# Patient Record
Sex: FEMALE | Race: WHITE | NOT HISPANIC OR LATINO
[De-identification: names, ages, dates, MRNs, and addresses within clinical notes are randomized per-mention and may not be internally consistent; named-entity substitution may affect disease eponyms.]

---

## 2017-05-16 ENCOUNTER — APPOINTMENT (OUTPATIENT)
Dept: PSYCHIATRY | Facility: CLINIC | Age: 27
End: 2017-05-16

## 2017-05-16 PROBLEM — Z00.00 ENCOUNTER FOR PREVENTIVE HEALTH EXAMINATION: Status: ACTIVE | Noted: 2017-05-16

## 2017-08-01 ENCOUNTER — APPOINTMENT (OUTPATIENT)
Dept: PSYCHIATRY | Facility: CLINIC | Age: 27
End: 2017-08-01
Payer: COMMERCIAL

## 2017-08-01 PROCEDURE — 99214 OFFICE O/P EST MOD 30 MIN: CPT

## 2017-08-01 RX ORDER — FLUOXETINE HYDROCHLORIDE 40 MG/1
40 CAPSULE ORAL DAILY
Qty: 60 | Refills: 2 | Status: ACTIVE | COMMUNITY
Start: 1900-01-01 | End: 1900-01-01

## 2017-10-16 LAB
24R-OH-CALCIDIOL SERPL-MCNC: 33 PG/ML
ALBUMIN SERPL ELPH-MCNC: 4.3 G/DL
ALP BLD-CCNC: 69 U/L
ALT SERPL-CCNC: 31 U/L
ANION GAP SERPL CALC-SCNC: 22 MMOL/L
AST SERPL-CCNC: 24 U/L
BASOPHILS # BLD AUTO: 0.04 K/UL
BASOPHILS NFR BLD AUTO: 0.4 %
BILIRUB SERPL-MCNC: 0.3 MG/DL
BUN SERPL-MCNC: 12 MG/DL
CALCIUM SERPL-MCNC: 9.9 MG/DL
CHLORIDE SERPL-SCNC: 100 MMOL/L
CHOLEST SERPL-MCNC: 264 MG/DL
CHOLEST/HDLC SERPL: 8 RATIO
CO2 SERPL-SCNC: 18 MMOL/L
CREAT SERPL-MCNC: 0.71 MG/DL
EOSINOPHIL # BLD AUTO: 0.09 K/UL
EOSINOPHIL NFR BLD AUTO: 0.8 %
ESTIMATED AVERAGE GLUCOSE: 105 MG/DL
HBA1C MFR BLD HPLC: 5.3 %
HCT VFR BLD CALC: 43.3 %
HDLC SERPL-MCNC: 33 MG/DL
HGB BLD-MCNC: 14.4 G/DL
IMM GRANULOCYTES NFR BLD AUTO: 0.4 %
LDLC SERPL CALC-MCNC: NORMAL
LYMPHOCYTES # BLD AUTO: 2.68 K/UL
LYMPHOCYTES NFR BLD AUTO: 25.3 %
MAN DIFF?: NORMAL
MCHC RBC-ENTMCNC: 30.5 PG
MCHC RBC-ENTMCNC: 33.3 GM/DL
MCV RBC AUTO: 91.7 FL
MONOCYTES # BLD AUTO: 0.86 K/UL
MONOCYTES NFR BLD AUTO: 8.1 %
NEUTROPHILS # BLD AUTO: 6.89 K/UL
NEUTROPHILS NFR BLD AUTO: 65 %
PLATELET # BLD AUTO: 315 K/UL
POTASSIUM SERPL-SCNC: 4.5 MMOL/L
PROT SERPL-MCNC: 7.3 G/DL
RBC # BLD: 4.72 M/UL
RBC # FLD: 12.9 %
SODIUM SERPL-SCNC: 140 MMOL/L
TRIGL SERPL-MCNC: 822 MG/DL
TSH SERPL-ACNC: 1.42 UIU/ML
WBC # FLD AUTO: 10.6 K/UL

## 2017-11-01 ENCOUNTER — APPOINTMENT (OUTPATIENT)
Dept: PSYCHIATRY | Facility: CLINIC | Age: 27
End: 2017-11-01
Payer: COMMERCIAL

## 2017-11-01 DIAGNOSIS — F41.9 ANXIETY DISORDER, UNSPECIFIED: ICD-10-CM

## 2017-11-01 DIAGNOSIS — F42.8 OTHER OBSESSIVE COMPULSIVE DISORDER: ICD-10-CM

## 2017-11-01 DIAGNOSIS — F33.9 MAJOR DEPRESSIVE DISORDER, RECURRENT, UNSPECIFIED: ICD-10-CM

## 2017-11-01 DIAGNOSIS — F41.0 PANIC DISORDER [EPISODIC PAROXYSMAL ANXIETY]: ICD-10-CM

## 2017-11-01 PROCEDURE — 99214 OFFICE O/P EST MOD 30 MIN: CPT

## 2017-11-01 RX ORDER — BUPROPION HYDROCHLORIDE 300 MG/1
300 TABLET, EXTENDED RELEASE ORAL
Qty: 30 | Refills: 2 | Status: ACTIVE | COMMUNITY
Start: 1900-01-01 | End: 1900-01-01

## 2017-12-13 ENCOUNTER — APPOINTMENT (OUTPATIENT)
Dept: PSYCHIATRY | Facility: CLINIC | Age: 27
End: 2017-12-13

## 2021-08-30 ENCOUNTER — NON-APPOINTMENT (OUTPATIENT)
Age: 31
End: 2021-08-30

## 2021-08-31 ENCOUNTER — APPOINTMENT (OUTPATIENT)
Dept: PLASTIC SURGERY | Facility: CLINIC | Age: 31
End: 2021-08-31
Payer: COMMERCIAL

## 2021-08-31 VITALS — OXYGEN SATURATION: 100 % | WEIGHT: 185 LBS | BODY MASS INDEX: 29.38 KG/M2 | HEART RATE: 63 BPM | HEIGHT: 66.5 IN

## 2021-08-31 PROCEDURE — 99205 OFFICE O/P NEW HI 60 MIN: CPT

## 2021-09-08 NOTE — ASSESSMENT
[FreeTextEntry1] : I reviewed with Ms. AQUINO  in detail the risks, benefits, and alternatives of both implant based breast reconstruction as well as autologous tissue reconstruction. \par Specifically, I explained to her that an implant reconstruction usually consists of two separate stages with two surgeries spaced about 4 months apart. At the time of the mastectomy, a tissue expander is placed underneath the pectoralis muscle or on top of the pectoralis muscle and is often reinforced with biologic mesh - acellular dermal matrix. We expand the tissue expander secondarily in the office by injecting saline into the implant percutaneously until the desired size breast mound is achieved. At that point a second stage operation is scheduled where we take her back to operating room and remove the tissue expander and place a permanent breast implant.  The permanent prosthesis can be either silicone or saline.  The first stage of the reconstruction is approximately 3 hours for one breast and 4-5 hours for a bilateral procedure, with a 1-2 night hospital stay and a four-week recovery.  The second stage surgery is an outpatient procedure with a two-week recovery. I reviewed with her the risks of implant reconstruction including but not limited to bleeding, scarring, implant infection, implant rupture, capsule contracture, implant malposition, asymmetry, contour abnormality, and need for revision surgeries. I also discussed the FDA recommendations for silicone implant rupture screening with MRI as well as the details of BII - (breast implant illness) and ALCL. \par \par I then reviewed with MONIKA  the details of autologous tissue reconstruction, specifically using a free flap from her lower abdomen.  This operation entails transplanting the skin, the fat, and blood vessels from the lower abdomen up to the chest wall where we reattach the artery and vein to blood vessels on the chest wall behind the rib to re-vascularize the tissue called a ‘flap’ utilizing microsurgical techniques. We attempt to save all of the muscle fibers of the abdominal rectus muscle to minimize the chance of an abdominal wall weakness, bulge or hernia and only transfer the perforating blood vessels.  This is called a CYNTHIA flap.  I explained to her the scar burden associated with this operation including the scars on the breasts and the lower abdomen and around the umbilicus.  I explained to her that there is a risk of free flap loss and vessel thrombosis requiring a return to the operating room for emergent exploration in an attempt to salvage the flap.  If we do lose a flap do to vascular compromise, we would likely place a tissue expander at the time of her returning to the operating room in order to preserve the breast skin envelope and perform a delayed reconstruction.  I reviewed the risks of abdominal wall morbidity including but not limited to abdominal wall weakness, hernia, or bulge. \par \par The CYNTHIA flap is designed to minimize the risk of abdominal wall morbidity as opposed to a TRAM flap that cuts the abdominal wall muscle, but even a CYNTHIA flap has a small chance of abdominal wall bulge, weakness or hernia.  This operation is approximately 6 hours for one side and 9 hours for a bilateral procedure with a three day hospitalization and a six-week recovery period. I also explained that there is a possibility of contour abnormality, asymmetry between the two breasts, and possible need for revision surgery. A surgery on the native contralateral breast to achieve symmetry in the setting of a unilateral mastectomy is usually required and often performed at the time of the implant exchange or nipple reconstruction. The nipple areolar reconstruction is performed at a later date as a separate procedure. We may also perform immediate sensory nerve repair with AxoGen nerve interposition graft to treat postmastectomy hypesthesia and pain syndrome.\par

## 2021-09-08 NOTE — HISTORY OF PRESENT ILLNESS
[FreeTextEntry1] : 30 y.o female present initial consultation for breast reconstruction at the time of prophylactic mastectomy She is BRCA 1+. Strong family history of breast cancer - mother and two maternal aunts. Mother had breast cancer in her 50's and aunts in their 40's and 50's. Her mother is BRCA+. Clover was tested 5 years ago and is BRCA1+. She HAs been undergoing high risk screening at [facility] with MRI and U/S every 6 months. Had U/S guided biopsy on each breast --> benign fibroadenoma. Is being followed by Dr. Moctezuma.

## 2021-09-08 NOTE — PHYSICAL EXAM
[Bra Size: _______] : Bra Size: [unfilled] [de-identified] : no scars, no masses, no nipple inversion, no nipple discharge, SN- N: 29 cm L, 29 cm R, BD: 16.5 cm  L 17 cm R, grade II/III ptosis  [de-identified] : NT, ND, no masses, +adequate tissue for autologous reconstruction, +adiposity, +laxity

## 2021-09-09 ENCOUNTER — APPOINTMENT (OUTPATIENT)
Dept: PLASTIC SURGERY | Facility: CLINIC | Age: 31
End: 2021-09-09
Payer: COMMERCIAL

## 2021-09-09 VITALS — BODY MASS INDEX: 29.73 KG/M2 | HEIGHT: 66 IN | WEIGHT: 185 LBS

## 2021-09-09 DIAGNOSIS — Z87.891 PERSONAL HISTORY OF NICOTINE DEPENDENCE: ICD-10-CM

## 2021-09-09 DIAGNOSIS — Z87.898 PERSONAL HISTORY OF OTHER SPECIFIED CONDITIONS: ICD-10-CM

## 2021-09-09 PROCEDURE — 99215 OFFICE O/P EST HI 40 MIN: CPT

## 2021-09-09 RX ORDER — CLOMIPRAMINE HYDROCHLORIDE 50 MG/1
50 CAPSULE ORAL
Qty: 90 | Refills: 0 | Status: DISCONTINUED | COMMUNITY
Start: 2017-11-01 | End: 2021-09-09

## 2021-09-09 RX ORDER — RISPERIDONE 0.5 MG/1
0.5 TABLET, FILM COATED ORAL
Qty: 30 | Refills: 2 | Status: DISCONTINUED | COMMUNITY
End: 2021-09-09

## 2021-09-09 RX ORDER — LAMOTRIGINE 150 MG/1
TABLET ORAL
Refills: 0 | Status: ACTIVE | COMMUNITY

## 2021-10-05 ENCOUNTER — NON-APPOINTMENT (OUTPATIENT)
Age: 31
End: 2021-10-05

## 2021-10-25 ENCOUNTER — APPOINTMENT (OUTPATIENT)
Dept: PLASTIC SURGERY | Facility: CLINIC | Age: 31
End: 2021-10-25
Payer: COMMERCIAL

## 2021-10-25 PROCEDURE — 99214 OFFICE O/P EST MOD 30 MIN: CPT | Mod: 95

## 2021-10-30 NOTE — ASSESSMENT
[FreeTextEntry1] : I spent 30 minutes reviewing the plan for surgery and answering Clover and her father questions they stated that I had answered all of their questions adequately. She will obtain CTA to evaluate lower abdominal perforators for CYNTHIA flap recon. We will coordinate with Dr. Wood's office.

## 2021-10-30 NOTE — HISTORY OF PRESENT ILLNESS
[Home] : at home, [unfilled] , at the time of the visit. [Medical Office: ([facility])___] : at the medical office located in  [Parents] : parents [Verbal consent obtained from patient] : the patient, [unfilled] [FreeTextEntry1] : \par \par Clover and her father participated in this teleheath appointment to answer questiona and plan for upcoming BL prophylactic mastectomy and CYNTHIA flap. She has consulted with Dr. Wood and has ellected to proceed with surgery, \par \par \par Hx: 30 y.o female present initial consultation for breast reconstruction at the time of prophylactic mastectomy She is BRCA 1+. Strong family history of breast cancer - mother and two maternal aunts. Mother had breast cancer in her 50's and aunts in their 40's and 50's. Her mother is BRCA+. Clover was tested 5 years ago and is BRCA1+. She HAs been undergoing high risk screening at [facility] with MRI and U/S every 6 months.

## 2021-11-05 ENCOUNTER — NON-APPOINTMENT (OUTPATIENT)
Age: 31
End: 2021-11-05

## 2021-11-05 ENCOUNTER — APPOINTMENT (OUTPATIENT)
Dept: BREAST CENTER | Facility: CLINIC | Age: 31
End: 2021-11-05
Payer: COMMERCIAL

## 2021-11-05 VITALS
WEIGHT: 185 LBS | SYSTOLIC BLOOD PRESSURE: 112 MMHG | BODY MASS INDEX: 29.38 KG/M2 | DIASTOLIC BLOOD PRESSURE: 66 MMHG | HEART RATE: 64 BPM | HEIGHT: 66.5 IN | OXYGEN SATURATION: 98 %

## 2021-11-05 DIAGNOSIS — N60.12 DIFFUSE CYSTIC MASTOPATHY OF LEFT BREAST: ICD-10-CM

## 2021-11-05 DIAGNOSIS — Z78.9 OTHER SPECIFIED HEALTH STATUS: ICD-10-CM

## 2021-11-05 DIAGNOSIS — Z80.0 FAMILY HISTORY OF MALIGNANT NEOPLASM OF DIGESTIVE ORGANS: ICD-10-CM

## 2021-11-05 DIAGNOSIS — R92.2 INCONCLUSIVE MAMMOGRAM: ICD-10-CM

## 2021-11-05 DIAGNOSIS — N60.11 DIFFUSE CYSTIC MASTOPATHY OF LEFT BREAST: ICD-10-CM

## 2021-11-05 DIAGNOSIS — Z80.41 FAMILY HISTORY OF MALIGNANT NEOPLASM OF OVARY: ICD-10-CM

## 2021-11-05 DIAGNOSIS — Z86.59 PERSONAL HISTORY OF OTHER MENTAL AND BEHAVIORAL DISORDERS: ICD-10-CM

## 2021-11-05 DIAGNOSIS — Z80.3 FAMILY HISTORY OF MALIGNANT NEOPLASM OF BREAST: ICD-10-CM

## 2021-11-05 PROCEDURE — 99204 OFFICE O/P NEW MOD 45 MIN: CPT

## 2021-11-05 NOTE — REASON FOR VISIT
[Initial Evaluation] : an initial evaluation [FreeTextEntry1] : The patient comes in with a strong family history of breast and ovarian cancer no known BRCA1 mutation for consultation regarding risk reduction prophylactic mastectomy.  Our

## 2021-11-05 NOTE — PHYSICAL EXAM
[Normocephalic] : normocephalic [Atraumatic] : atraumatic [EOMI] : extra ocular movement intact [Supple] : supple [No Supraclavicular Adenopathy] : no supraclavicular adenopathy [No Cervical Adenopathy] : no cervical adenopathy [Examined in the supine and seated position] : examined in the supine and seated position [No dominant masses] : no dominant masses in right breast  [No dominant masses] : no dominant masses left breast [No Nipple Retraction] : no left nipple retraction [No Nipple Discharge] : no left nipple discharge [Breast Mass Right Breast ___cm] : no masses [Breast Mass Left Breast ___cm] : no masses [Breast Nipple Inversion] : nipples not inverted [Breast Nipple Retraction] : nipples not retracted [Breast Nipple Flattening] : nipples not flattened [Breast Nipple Fissures] : nipples not fissured [Breast Abnormal Lactation (Galactorrhea)] : no galactorrhea [Breast Abnormal Secretion Bloody Fluid] : no bloody discharge [Breast Abnormal Secretion Serous Fluid] : no serous discharge [Breast Abnormal Secretion Opalescent Fluid] : no milky discharge [No Axillary Lymphadenopathy] : no left axillary lymphadenopathy [No Edema] : no edema [No Rashes] : no rashes [No Ulceration] : no ulceration [de-identified] : On exam, the patient has moderately ptotic C-cup breasts.  On palpation, I cannot feel any suspicious densities in either breast.  She has no axillary, supraclavicular, or cervical adenopathy.

## 2021-11-05 NOTE — HISTORY OF PRESENT ILLNESS
[FreeTextEntry1] : The patient is a 30-year-old nulliparous premenopausal white female with Ashkenazi Eastern  Episcopalian heritage.  She underwent menarche at age 13.  She has a strong family history with her mother had breast cancer at age 57.  Her maternal aunt had ovarian cancer at age 44.  Another maternal aunt had breast cancer at age 38.  A paternal great grandmother had breast cancer at age 69.  Her maternal great grandfather had gastric cancer at age 61.  Her maternal grandfather had hepatobiliary cancer at age 39.  The patient's mother as well as the patient and her brother all tested BRCA1 positive with a [id number].3598C>T mutation.  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  The patient comes in for discussion regarding risk reduction prophylactic mastectomy.

## 2021-11-05 NOTE — ASSESSMENT
[FreeTextEntry1] : The patient is a 30-year-old nulliparous premenopausal white female with Ashkenazi Eastern  Zoroastrianism heritage.  She underwent menarche at age 13.  She has a strong family history with her mother had breast cancer at age 57.  Her maternal aunt had ovarian cancer at age 44.  Another maternal aunt had breast cancer at age 38.  A paternal great grandmother had breast cancer at age 69.  Her maternal great grandfather had gastric cancer at age 61.  Her maternal grandfather had hepatobiliary cancer at age 39.  The patient's mother as well as the patient and her brother all tested BRCA1 positive with a c.3598C>T mutation.  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  On exam today I cannot feel any suspicious densities in either breast.  The patient has submitted all her prior films from outside which are being downloaded into our system.  The patient did have her last MRI on May 21, 2021 which showed no suspicious findings.  She has only been undergoing ultrasounds and has not had a mammography so would I like her to have a mammography prior to surgery as well.  She is planning to have this performed at [place].  I spoke to the patient at length with her parents on conference call.  The patient and her family are well aware of the risk reduction strategies for breast and ovarian cancer.  She is already seen Dr. Lerman in consultation for reconstructive surgery and has decided to go forward with risk reduction prophylactic mastectomy.  I think she is an excellent candidate for nipple sparing procedure.  I spoke to her about all risk complications of the procedure including risk of skin flap necrosis, nipple loss, hematoma, and infection and she is well aware of the risk.  She has seen Dr. Lerman and is planning on bilateral CYNTHIA flap autologous reconstruction.  She is currently planned for surgery on January 12, 2022 and will be coming up from [place] and getting her preop testing here.  We will follow up on the mammography report prior to surgery and if she has any further questions she can give me a call back.

## 2021-12-22 ENCOUNTER — NON-APPOINTMENT (OUTPATIENT)
Age: 31
End: 2021-12-22

## 2021-12-22 ENCOUNTER — APPOINTMENT (OUTPATIENT)
Dept: INTERNAL MEDICINE | Facility: CLINIC | Age: 31
End: 2021-12-22
Payer: COMMERCIAL

## 2021-12-22 ENCOUNTER — LABORATORY RESULT (OUTPATIENT)
Age: 31
End: 2021-12-22

## 2021-12-22 VITALS
SYSTOLIC BLOOD PRESSURE: 100 MMHG | HEART RATE: 93 BPM | DIASTOLIC BLOOD PRESSURE: 63 MMHG | TEMPERATURE: 96.8 F | OXYGEN SATURATION: 99 % | WEIGHT: 193 LBS | BODY MASS INDEX: 30.65 KG/M2 | HEIGHT: 66.5 IN

## 2021-12-22 DIAGNOSIS — E78.5 HYPERLIPIDEMIA, UNSPECIFIED: ICD-10-CM

## 2021-12-22 DIAGNOSIS — Z01.818 ENCOUNTER FOR OTHER PREPROCEDURAL EXAMINATION: ICD-10-CM

## 2021-12-22 PROCEDURE — 93000 ELECTROCARDIOGRAM COMPLETE: CPT

## 2021-12-22 PROCEDURE — 36415 COLL VENOUS BLD VENIPUNCTURE: CPT

## 2021-12-22 PROCEDURE — 99203 OFFICE O/P NEW LOW 30 MIN: CPT | Mod: 25

## 2021-12-23 ENCOUNTER — NON-APPOINTMENT (OUTPATIENT)
Age: 31
End: 2021-12-23

## 2021-12-23 LAB
ALBUMIN SERPL ELPH-MCNC: 4.5 G/DL
ALP BLD-CCNC: 70 U/L
ALT SERPL-CCNC: 14 U/L
ANION GAP SERPL CALC-SCNC: 13 MMOL/L
APPEARANCE: CLEAR
AST SERPL-CCNC: 16 U/L
BASOPHILS # BLD AUTO: 0.08 K/UL
BASOPHILS NFR BLD AUTO: 0.7 %
BILIRUB SERPL-MCNC: 0.3 MG/DL
BILIRUBIN URINE: NEGATIVE
BLOOD URINE: ABNORMAL
BUN SERPL-MCNC: 10 MG/DL
CALCIUM SERPL-MCNC: 9.5 MG/DL
CHLORIDE SERPL-SCNC: 101 MMOL/L
CHOLEST SERPL-MCNC: 264 MG/DL
CO2 SERPL-SCNC: 25 MMOL/L
COLOR: YELLOW
CREAT SERPL-MCNC: 1.1 MG/DL
EOSINOPHIL # BLD AUTO: 0.11 K/UL
EOSINOPHIL NFR BLD AUTO: 1 %
ESTIMATED AVERAGE GLUCOSE: 97 MG/DL
GLUCOSE QUALITATIVE U: NEGATIVE
GLUCOSE SERPL-MCNC: 72 MG/DL
HBA1C MFR BLD HPLC: 5 %
HCG SERPL-MCNC: <1 MIU/ML
HCT VFR BLD CALC: 45.8 %
HDLC SERPL-MCNC: 52 MG/DL
HGB BLD-MCNC: 14.5 G/DL
IMM GRANULOCYTES NFR BLD AUTO: 0.4 %
KETONES URINE: NEGATIVE
LDLC SERPL CALC-MCNC: NORMAL MG/DL
LEUKOCYTE ESTERASE URINE: NEGATIVE
LYMPHOCYTES # BLD AUTO: 2.54 K/UL
LYMPHOCYTES NFR BLD AUTO: 22.3 %
MAN DIFF?: NORMAL
MCHC RBC-ENTMCNC: 31 PG
MCHC RBC-ENTMCNC: 31.7 GM/DL
MCV RBC AUTO: 98.1 FL
MONOCYTES # BLD AUTO: 0.71 K/UL
MONOCYTES NFR BLD AUTO: 6.2 %
NEUTROPHILS # BLD AUTO: 7.88 K/UL
NEUTROPHILS NFR BLD AUTO: 69.4 %
NITRITE URINE: NEGATIVE
NONHDLC SERPL-MCNC: 212 MG/DL
PH URINE: 6
PLATELET # BLD AUTO: 332 K/UL
POTASSIUM SERPL-SCNC: 4 MMOL/L
PROT SERPL-MCNC: 7.1 G/DL
PROTEIN URINE: NORMAL
RBC # BLD: 4.67 M/UL
RBC # FLD: 13.8 %
SODIUM SERPL-SCNC: 139 MMOL/L
SPECIFIC GRAVITY URINE: 1.02
TRIGL SERPL-MCNC: 444 MG/DL
TSH SERPL-ACNC: 1.21 UIU/ML
UROBILINOGEN URINE: NORMAL
WBC # FLD AUTO: 11.37 K/UL

## 2021-12-28 ENCOUNTER — NON-APPOINTMENT (OUTPATIENT)
Age: 31
End: 2021-12-28

## 2021-12-29 ENCOUNTER — RESULT REVIEW (OUTPATIENT)
Age: 31
End: 2021-12-29

## 2021-12-30 ENCOUNTER — NON-APPOINTMENT (OUTPATIENT)
Age: 31
End: 2021-12-30

## 2021-12-30 DIAGNOSIS — R92.8 OTHER ABNORMAL AND INCONCLUSIVE FINDINGS ON DIAGNOSTIC IMAGING OF BREAST: ICD-10-CM

## 2022-01-05 ENCOUNTER — APPOINTMENT (OUTPATIENT)
Dept: PLASTIC SURGERY | Facility: CLINIC | Age: 32
End: 2022-01-05

## 2022-01-05 ENCOUNTER — NON-APPOINTMENT (OUTPATIENT)
Age: 32
End: 2022-01-05

## 2022-01-05 RX ORDER — OXYCODONE 5 MG/1
5 TABLET ORAL EVERY 6 HOURS
Qty: 12 | Refills: 0 | Status: ACTIVE | COMMUNITY
Start: 2022-01-05 | End: 1900-01-01

## 2022-01-05 RX ORDER — DIAZEPAM 5 MG/1
5 TABLET ORAL EVERY 8 HOURS
Qty: 9 | Refills: 0 | Status: ACTIVE | COMMUNITY
Start: 2022-01-05 | End: 1900-01-01

## 2022-01-06 ENCOUNTER — RESULT REVIEW (OUTPATIENT)
Age: 32
End: 2022-01-06

## 2022-01-06 ENCOUNTER — NON-APPOINTMENT (OUTPATIENT)
Age: 32
End: 2022-01-06

## 2022-01-12 ENCOUNTER — APPOINTMENT (OUTPATIENT)
Dept: BREAST CENTER | Facility: HOSPITAL | Age: 32
End: 2022-01-12

## 2022-01-18 ENCOUNTER — NON-APPOINTMENT (OUTPATIENT)
Age: 32
End: 2022-01-18

## 2022-01-20 ENCOUNTER — APPOINTMENT (OUTPATIENT)
Dept: BREAST CENTER | Facility: CLINIC | Age: 32
End: 2022-01-20
Payer: COMMERCIAL

## 2022-01-20 ENCOUNTER — APPOINTMENT (OUTPATIENT)
Dept: PLASTIC SURGERY | Facility: CLINIC | Age: 32
End: 2022-01-20
Payer: COMMERCIAL

## 2022-01-20 VITALS — SYSTOLIC BLOOD PRESSURE: 109 MMHG | OXYGEN SATURATION: 97 % | DIASTOLIC BLOOD PRESSURE: 56 MMHG | HEART RATE: 73 BPM

## 2022-01-20 PROCEDURE — 99024 POSTOP FOLLOW-UP VISIT: CPT

## 2022-01-20 NOTE — HISTORY OF PRESENT ILLNESS
[FreeTextEntry1] : The patient is a 31-year-old nulliparous premenopausal white female with Ashkenazi Eastern  Zoroastrian heritage.  She underwent menarche at age 13.  She has a strong family history with her mother had breast cancer at age 57.  Her maternal aunt had ovarian cancer at age 44.  Another maternal aunt had breast cancer at age 38.  A paternal great grandmother had breast cancer at age 69.  Her maternal great grandfather had gastric cancer at age 61.  Her maternal grandfather had hepatobiliary cancer at age 39.  The patient's mother as well as the patient and her brother all tested BRCA1 positive with a [id number].3598C>T mutation.  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  The patient had negative preoperative diagnostic studies and underwent bilateral prophylactic nipple sparing mastectomies with bilateral CYNTHIA flap reconstructions by Dr. Lerman on January 12, 2022.  The final pathology was completely benign and she comes in now for postop follow-up.

## 2022-01-20 NOTE — REASON FOR VISIT
[Post Op: _________] : a [unfilled] post op visit [FreeTextEntry1] : The patient comes in with a strong family history of breast and ovarian cancer and a known BRCA1 mutation.  She underwent bilateral prophylactic nipple sparing mastectomies with bilateral CYNTHIA flap reconstructions by Dr. Lerman on January 12, 2022 and the final pathology was benign.  She comes in for postop follow-up.

## 2022-01-20 NOTE — PHYSICAL EXAM
[Normocephalic] : normocephalic [Atraumatic] : atraumatic [EOMI] : extra ocular movement intact [Supple] : supple [No Supraclavicular Adenopathy] : no supraclavicular adenopathy [No Cervical Adenopathy] : no cervical adenopathy [Examined in the supine and seated position] : examined in the supine and seated position [No dominant masses] : no dominant masses in right breast  [No dominant masses] : no dominant masses left breast [No Nipple Retraction] : no left nipple retraction [No Nipple Discharge] : no left nipple discharge [Breast Mass Right Breast ___cm] : no masses [Breast Mass Left Breast ___cm] : no masses [No Axillary Lymphadenopathy] : no left axillary lymphadenopathy [No Edema] : no edema [No Rashes] : no rashes [No Ulceration] : no ulceration [Breast Nipple Inversion] : nipples not inverted [Breast Nipple Retraction] : nipples not retracted [Breast Nipple Flattening] : nipples not flattened [Breast Nipple Fissures] : nipples not fissured [Breast Abnormal Lactation (Galactorrhea)] : no galactorrhea [Breast Abnormal Secretion Bloody Fluid] : no bloody discharge [Breast Abnormal Secretion Serous Fluid] : no serous discharge [Breast Abnormal Secretion Opalescent Fluid] : no milky discharge [de-identified] : On exam, the patient is doing well status post bilateral prophylactic nipple sparing mastectomies with bilateral CYNTHIA flap reconstructions.  Her native skin flaps are warm and viable and the CYNTHIA flap is viable with an excellent cosmetic result.  3 of her drains were removed by plastic surgery in the office today. [de-identified] : Status post prophylactic nipple sparing mastectomy with CYNTHIA flap reconstruction.  Wounds are healing well with no signs of any infection or hematoma and skin flaps and CYNTHIA flaps are viable. [de-identified] : Status post prophylactic nipple sparing mastectomy with CYNTHIA flap reconstruction.  Wounds are healing well with no signs of any infection or hematoma and skin flaps and CYNTHIA flaps are viable. [de-identified] : Wounds healing well with no signs of infection or hematoma.

## 2022-01-20 NOTE — ASSESSMENT
[FreeTextEntry1] : The patient is a 31-year-old nulliparous premenopausal white female with Ashkenazi Eastern  Latter-day heritage.  She underwent menarche at age 13.  She has a strong family history with her mother had breast cancer at age 57.  Her maternal aunt had ovarian cancer at age 44.  Another maternal aunt had breast cancer at age 38.  A paternal great grandmother had breast cancer at age 69.  Her maternal great grandfather had gastric cancer at age 61.  Her maternal grandfather had hepatobiliary cancer at age 39.  The patient's mother as well as the patient and her brother all tested BRCA1 positive with a c.3598C>T mutation.  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  The patient submitted all her prior films from outside and she did have her last MRI on May 21, 2021 which showed no suspicious findings.  She underwent a bilateral mammography at [place] [facility] with a bilateral ultrasound here at [facility] on January 7, 2022 showing no suspicious findings.  She saw Dr. Lerman in consultation for reconstructive surgery and decided to go forward with risk reduction prophylactic mastectomy.  She underwent bilateral prophylactic nipple sparing mastectomies with bilateral CYNTHIA flap reconstructions performed on January 12, 2022.  Final pathology was benign.  She was given a copy of the pathology for her records.  On exam today, her CYNTHIA flaps are warm and viable and her native skin flaps are viable.  She has good pain control and her breast drains were removed by Dr. Lerman in the office today.  The patient was reassured and should follow-up with plastic surgery from a cosmetic standpoint.  I would like to see her again in 6 months and I would like her to get a bilateral breast MRI 1 year postop.  She understands the need for continued 6-month breast exams as well.  She understands the need for close GYN oncology follow-up and the benefits for prophylactic risk reduction bilateral salpingo-oophorectomy before age 40.

## 2022-01-24 NOTE — PHYSICAL EXAM
[de-identified] : BL breast mounds soft, warm, no collections or infection, NACs pink and viable, CYNTHIA flap skin islands pink and viable, normal Cap Refil. ANGELINA drains removed Bilaterally.  [de-identified] : Soft, NT, ND, incisions C/D/I, Umbo viable, Left ANGELINA drain removed. right ANGELINA drain left in place --> serous.

## 2022-01-24 NOTE — HISTORY OF PRESENT ILLNESS
[FreeTextEntry1] : 30 y/o female presents 8 days s/p bilateral breast prophylactic CYNTHIA flap reconstruction joint with Dr. Wood. Doing very well at home, no f/c/n/v/d. Tolerating regular diet. +BM. Using Oxycodone for pain. 4 ANGELINA drains in place.

## 2022-01-24 NOTE — ASSESSMENT
[FreeTextEntry1] : Healing well POD# 8 from BL CYNTHIA flap recon doing very well. She has 1 right abdominal ANGELINA drain remaining - will coordinate removal with us based on output. I reviewed her PO instructions with her and her mother who present throughout the encounter. Follow up with Anastasia adkins for Drain removal and then see me in 3 weeks,

## 2022-01-31 ENCOUNTER — APPOINTMENT (OUTPATIENT)
Dept: PLASTIC SURGERY | Facility: CLINIC | Age: 32
End: 2022-01-31
Payer: COMMERCIAL

## 2022-01-31 PROCEDURE — 99024 POSTOP FOLLOW-UP VISIT: CPT

## 2022-01-31 NOTE — HISTORY OF PRESENT ILLNESS
[FreeTextEntry1] : 30 y/o female presents 19  days s/p bilateral breast prophylactic CYNTHIA flap reconstruction joint with Dr. Wood. Doing very well at home, denies f/c/n/v. Here for drain Right abdominal drain check, outputs in mid 40's. Patient states noticing that ANGELINA bulb keeps loosing suction.

## 2022-01-31 NOTE — SURGICAL HISTORY
[de-identified] : 01/12/22; B/L Prophylactic mastectomy and CYNTHIA Flap Reconstruction mya/ [place]

## 2022-01-31 NOTE — PHYSICAL EXAM
[de-identified] : BL breast mounds soft, warm, no collections or infection, NACs pink and viable, CYNTHIA flap skin islands pink and viable, normal Cap Refill.  [de-identified] : Soft, NT, ND, incisions C/D/I, Umbo viable, Right ANGELINA drain left in place --> serous.

## 2022-01-31 NOTE — ASSESSMENT
[FreeTextEntry1] : - Healing well from BL CYNTHIA flap recon\par - Right abdominal ANGELINA drain remaining - will email Wed for possible drain removal Thursday\par - Changed right ANGELINA bulb, soft tissue was clogging entry point\par - F/U with Dr. Lerman in 2 weeks\par - Scar treatment to Abdomen \par - Reviewed activity and limitations.\par

## 2022-02-07 ENCOUNTER — APPOINTMENT (OUTPATIENT)
Dept: PLASTIC SURGERY | Facility: CLINIC | Age: 32
End: 2022-02-07
Payer: COMMERCIAL

## 2022-02-07 PROCEDURE — 99024 POSTOP FOLLOW-UP VISIT: CPT

## 2022-02-15 ENCOUNTER — APPOINTMENT (OUTPATIENT)
Dept: PLASTIC SURGERY | Facility: CLINIC | Age: 32
End: 2022-02-15

## 2022-02-17 ENCOUNTER — APPOINTMENT (OUTPATIENT)
Dept: PLASTIC SURGERY | Facility: CLINIC | Age: 32
End: 2022-02-17

## 2022-02-22 ENCOUNTER — APPOINTMENT (OUTPATIENT)
Dept: PLASTIC SURGERY | Facility: CLINIC | Age: 32
End: 2022-02-22
Payer: COMMERCIAL

## 2022-02-22 DIAGNOSIS — N64.89 OTHER SPECIFIED DISORDERS OF BREAST: ICD-10-CM

## 2022-02-22 PROCEDURE — 10160 PNXR ASPIR ABSC HMTMA BULLA: CPT | Mod: 78

## 2022-02-22 PROCEDURE — 99024 POSTOP FOLLOW-UP VISIT: CPT

## 2022-02-22 NOTE — SURGICAL HISTORY
[de-identified] : 01/12/22; B/L Prophylactic mastectomy and CYNTHIA Flap Reconstruction may/ [place]

## 2022-02-22 NOTE — ASSESSMENT
[FreeTextEntry1] : - Healing well from BL CYNTHIA flap recon\par - Has abdominal seroma aspirated today - \par - Wear abdominal compression garment\par - F/U in 2 weeks for possible repeat aspiration. \par

## 2022-02-22 NOTE — PROCEDURE
[Nl] : None [FreeTextEntry1] : abdominal donor site seroma  [FreeTextEntry2] : Percutaneous seroma aspiration abdominal donor site [FreeTextEntry6] : After prepping the area with ChloraPrep I used an 18 gauge needle and a 60 cc syringe to percutaneously aspirate the ballotable fluid collection in the abdominal donor site infraumbilical midline. I aspirated a total of 100 cc of serous fluid without complication and then placed a small bandaid of the puncture site.\par

## 2022-02-22 NOTE — PHYSICAL EXAM
[de-identified] : BL breast mounds soft, warm, no collections or infection, NACs pink and viable, CYNTHIA flap skin islands pink and viable, normal Cap Refill.  [de-identified] : + ballotable fluid collection loser abdominal donor site, no erythema or sign of infection consistant with seroma

## 2022-02-27 NOTE — SURGICAL HISTORY
[de-identified] : 01/12/22; B/L Prophylactic mastectomy and CYNTHIA Flap Reconstruction may/ [place]

## 2022-02-27 NOTE — HISTORY OF PRESENT ILLNESS
[FreeTextEntry1] : 32 y/o female presents 1 month s/p bilateral breast prophylactic CYNTHIA flap reconstruction joint with Dr. Wood. Doing very well at home, denies f/c/n/v. Here for drain Right abdominal drain removal - Drain output down.

## 2022-02-27 NOTE — ASSESSMENT
[FreeTextEntry1] : - Healing well from BL CYNTHIA flap recon\par - Right abdominal ANGELINA drain removed\par - F/U with Dr. Lerman in 2 weeks\par - Scar treatment to Abdomen \par - Reviewed activity and limitations.\par

## 2022-02-27 NOTE — PHYSICAL EXAM
[de-identified] : BL breast mounds soft, warm, no collections or infection, NACs pink and viable, CYNTHIA flap skin islands pink and viable, normal Cap Refill.  [de-identified] : Soft, NT, ND, incisions C/D/I, Umbo viable, Right abdominal ANGELINA drain removed.

## 2022-03-07 ENCOUNTER — APPOINTMENT (OUTPATIENT)
Dept: PLASTIC SURGERY | Facility: CLINIC | Age: 32
End: 2022-03-07
Payer: COMMERCIAL

## 2022-03-07 PROCEDURE — 99024 POSTOP FOLLOW-UP VISIT: CPT

## 2022-03-07 NOTE — PHYSICAL EXAM
[de-identified] : BL breast mounds soft, warm, no collections or infection, healing well, NACs pink and viable, CYNTHIA flap skin islands pink and viable, normal Cap Refill.  [de-identified] : No obvious recurrent seroma - no ballotable fluid shift, aspirated 5cc percutaneously today to ensure no collection that could not be appreciated on exam alone.  no erythema or sign of infection.

## 2022-03-07 NOTE — HISTORY OF PRESENT ILLNESS
[FreeTextEntry1] : 30 y/o female presents 2 months PO s/p bilateral breast prophylactic mastectomy and CYNTHIA flap reconstruction joint with Dr. Wood. Doing well at home, denies f/c/n/v. Has had donor site seroma drained x 2 and presents for follow up. feeling better does not think it has recurred.

## 2022-03-07 NOTE — SURGICAL HISTORY
[de-identified] : 01/12/22; B/L Prophylactic mastectomy and CYNTHIA Flap Reconstruction may/ [place]

## 2022-03-07 NOTE — ASSESSMENT
[FreeTextEntry1] : - Healing well from BL CYNTHIA flap recon. Abdominal seroma resolving - only 5cc today, hopefully will not recur, now only trace fluid - likely will not need any repeat aspiration. \par - Encouraged breast massage to improve edema control and soften up tissue in anticipation of revision surgery \par - F/U in 2 months to plan for 2nd stage recon. \par

## 2022-05-09 ENCOUNTER — APPOINTMENT (OUTPATIENT)
Dept: PLASTIC SURGERY | Facility: CLINIC | Age: 32
End: 2022-05-09
Payer: COMMERCIAL

## 2022-05-09 PROCEDURE — 99213 OFFICE O/P EST LOW 20 MIN: CPT

## 2022-05-09 NOTE — SURGICAL HISTORY
[de-identified] : 01/12/22; B/L Prophylactic mastectomy and CYNTHIA Flap Reconstruction may/ [place]

## 2022-05-09 NOTE — ASSESSMENT
[FreeTextEntry1] : Healing well s/p B/L CYNTHIA flap reconstruction ready for 2nd stage surgery. \par \par will need:\par 1) Excision of fat necrosis right breast lower inner quadrant\par 2) revision BL breast recon with Vertical style mastopexy skin reduction to reposition the footprint of the breast mound and correct nipple malposition and asymmetry\par 3) excision of abdominal dog ears and correction of abdominal donor site with suction lipectomy \par \par - Advised weight loss to achieve the thin contoured waistline patient is striving for. Explained in depth this second procedure.\par \par

## 2022-05-09 NOTE — HISTORY OF PRESENT ILLNESS
[FreeTextEntry1] : 30 y/o female s/p bilateral breast prophylactic mastectomy and CYNTHIA flap reconstruction joint with Dr. Wood on 01/12/22. Doing well at home in [place], denies f/c/n/v. Has had donor site seroma drained x 2 and reports doing well no more collections. Reports having mild firmness right breast and has been massaging daily with relief. Has not done PT. Presents for follow up to discuss 2nd stage surgery.For second stage would like to have liposuction from flanks as well as breast lift. Plans on going to [place] in last week of June and wedding in Aug and Sept.

## 2022-05-09 NOTE — PHYSICAL EXAM
[de-identified] : BL breast mounds soft and well healed. has assymetry of the breast mounds with malposition of the NACs and footprint of the breast with inballance between the skin envelope and the underlying CYNTHIA flap reconstructed breast parenchyma, discreet 2cm x 4cm area of fat necrosis right breast lower inner quadrant, no collections or infection,  [de-identified] : Well healed, no erythema, collection, or signs of infection, soft, NT, ND. + bl abdominal dog ears, step off deformity and lipodystrophy

## 2022-06-26 ENCOUNTER — TRANSCRIPTION ENCOUNTER (OUTPATIENT)
Age: 32
End: 2022-06-26

## 2022-09-07 ENCOUNTER — APPOINTMENT (OUTPATIENT)
Dept: PLASTIC SURGERY | Facility: CLINIC | Age: 32
End: 2022-09-07

## 2022-09-07 DIAGNOSIS — N64.1 FAT NECROSIS OF BREAST: ICD-10-CM

## 2022-09-07 PROCEDURE — 99213 OFFICE O/P EST LOW 20 MIN: CPT | Mod: 95

## 2022-09-07 RX ORDER — OXYCODONE 5 MG/1
5 TABLET ORAL
Qty: 12 | Refills: 0 | Status: ACTIVE | COMMUNITY
Start: 2022-09-07 | End: 1900-01-01

## 2022-09-07 RX ORDER — IBUPROFEN 600 MG/1
600 TABLET, FILM COATED ORAL EVERY 6 HOURS
Qty: 24 | Refills: 0 | Status: ACTIVE | COMMUNITY
Start: 2022-09-07 | End: 1900-01-01

## 2022-09-07 NOTE — HISTORY OF PRESENT ILLNESS
[Home] : at home, [unfilled] , at the time of the visit. [Other Location: e.g. Home (Enter Location, City,State)___] : at [unfilled] [Mother] : mother [Verbal consent obtained from patient] : the patient, [unfilled] [FreeTextEntry1] : Patient Name: MONIKA AQUINO \par : 1990 \par Date: 2022 \par Attending: Dr. Oren Lerman\par \par HPI: preop consultation for bilateral breast reconstruction revision (vertical mastopexy, excision of fat necrosis, abdominal donor site revision, liposuction) on 22. \par \par Allergies: penicillin\par Medication prescribed: oxycodone 5 mg, ibuprofen 600 mg\par \par ROS: complete 14 point review of systems negative except pertinent items reviewed in the HPI. Other non-contributory items reviewed in our new patient questionnaire and we have submitted it to be scanned into the medical record. \par \par Physical Exam: \par completed at time of in office evaluation \par \par Assessment/Plan:\par We have discussed pre and postop instructions, recovery limitations, restrictions and expectations, ERAS protocol, NPO status, transportation home, postop medications, COVID-19 policies, benefits and risks of the procedure. Pre-op labs reviewed. The patient would like to proceed with surgery as scheduled.\par \par LISA CARRANZA NP\par \par

## 2022-09-13 ENCOUNTER — LABORATORY RESULT (OUTPATIENT)
Age: 32
End: 2022-09-13

## 2022-09-14 ENCOUNTER — APPOINTMENT (OUTPATIENT)
Dept: PLASTIC SURGERY | Facility: CLINIC | Age: 32
End: 2022-09-14

## 2022-09-14 ENCOUNTER — APPOINTMENT (OUTPATIENT)
Dept: BREAST CENTER | Facility: CLINIC | Age: 32
End: 2022-09-14

## 2022-09-14 VITALS
BODY MASS INDEX: 29.41 KG/M2 | OXYGEN SATURATION: 99 % | SYSTOLIC BLOOD PRESSURE: 117 MMHG | WEIGHT: 185 LBS | DIASTOLIC BLOOD PRESSURE: 76 MMHG | HEART RATE: 60 BPM

## 2022-09-14 PROCEDURE — 99213 OFFICE O/P EST LOW 20 MIN: CPT

## 2022-09-14 NOTE — REASON FOR VISIT
[Follow-Up: _____] : a [unfilled] follow-up visit [FreeTextEntry1] : The patient comes in with a strong family history of breast and ovarian cancer and a known BRCA1 mutation.  She underwent bilateral prophylactic nipple sparing mastectomies with bilateral CYNTHIA flap reconstructions by Dr. Lerman on January 12, 2022 and the final pathology was benign.  She comes in for routine follow-up.

## 2022-09-14 NOTE — ASSESSMENT
[FreeTextEntry1] : The patient is a 31-year-old nulliparous premenopausal white female with Ashkenazi Eastern  Congregation heritage.  She underwent menarche at age 13.  She has a strong family history with her mother had breast cancer at age 57.  Her maternal aunt had ovarian cancer at age 44.  Another maternal aunt had breast cancer at age 38.  A paternal great grandmother had breast cancer at age 69.  Her maternal great grandfather had gastric cancer at age 61.  Her maternal grandfather had hepatobiliary cancer at age 39.  The patient's mother as well as the patient and her brother all tested BRCA1 positive with a c.3598C>T mutation.  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  The patient submitted all her prior films from outside and she did have her last MRI on May 21, 2021 which showed no suspicious findings.  She underwent a bilateral mammography at [place] with a bilateral ultrasound here at [place] on January 7, 2022 showing no suspicious findings.  She saw Dr. Lerman in consultation for reconstructive surgery and decided to go forward with risk reduction prophylactic mastectomy.  She underwent bilateral prophylactic nipple sparing mastectomies with bilateral CYNTHIA flap reconstructions performed on January 12, 2022.  Final pathology was benign.  On exam today, her CYNTHIA flaps are well-healed and she has a good cosmetic result with no suspicious findings.  She is planning on her CYNTHIA flap revision surgery to be performed by Dr. Lerman on September 16, 2022.  I would like to see her again in 6 months and I would like her to get a bilateral breast MRI 1 year postop around March 2023 and she was given a prescription.  She understands the need for continued 6-month breast exams as well.  She understands the need for close GYN oncology follow-up and the benefits for prophylactic risk reduction bilateral salpingo-oophorectomy before age 40.

## 2022-09-14 NOTE — PHYSICAL EXAM
[Normocephalic] : normocephalic [Atraumatic] : atraumatic [EOMI] : extra ocular movement intact [Supple] : supple [No Supraclavicular Adenopathy] : no supraclavicular adenopathy [No Cervical Adenopathy] : no cervical adenopathy [Examined in the supine and seated position] : examined in the supine and seated position [No dominant masses] : no dominant masses in right breast  [No dominant masses] : no dominant masses left breast [No Nipple Retraction] : no left nipple retraction [No Nipple Discharge] : no left nipple discharge [Breast Mass Right Breast ___cm] : no masses [Breast Mass Left Breast ___cm] : no masses [No Axillary Lymphadenopathy] : no left axillary lymphadenopathy [No Edema] : no edema [No Rashes] : no rashes [No Ulceration] : no ulceration [Breast Nipple Inversion] : nipples not inverted [Breast Nipple Retraction] : nipples not retracted [Breast Nipple Flattening] : nipples not flattened [Breast Nipple Fissures] : nipples not fissured [Breast Abnormal Lactation (Galactorrhea)] : no galactorrhea [Breast Abnormal Secretion Bloody Fluid] : no bloody discharge [Breast Abnormal Secretion Serous Fluid] : no serous discharge [Breast Abnormal Secretion Opalescent Fluid] : no milky discharge [de-identified] : On exam, the patient has done well status post bilateral prophylactic nipple sparing mastectomies with bilateral CYNTHIA flap reconstructions.  She has a good result with no suspicious findings in either CYNTHIA flap.  She has no axillary, supraclavicular, or cervical adenopathy.  She has no sensation in either nipple areolar complex or inferior pole.  She has some moderate sensation on the superior and medial poles but no sensation on either lateral pole [de-identified] : Status post prophylactic nipple sparing mastectomy with CYNTHIA flap reconstruction.  Wounds have healed well and she has no suspicious findings. [de-identified] : Status post prophylactic nipple sparing mastectomy with CYNTHIA flap reconstruction.  Wounds have healed well and she has no suspicious findings.

## 2022-09-14 NOTE — HISTORY OF PRESENT ILLNESS
[FreeTextEntry1] : The patient is a 31-year-old nulliparous premenopausal white female with Ashkenazi Eastern  [employer]ist heritage.  She underwent menarche at age 13.  She has a strong family history with her mother had breast cancer at age 57.  Her maternal aunt had ovarian cancer at age 44.  Another maternal aunt had breast cancer at age 38.  A paternal great grandmother had breast cancer at age 69.  Her maternal great grandfather had gastric cancer at age 61.  Her maternal grandfather had hepatobiliary cancer at age 39.  The patient's mother as well as the patient and her brother all tested BRCA1 positive with a c.3598C>T mutation.  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  The patient had negative preoperative diagnostic studies and underwent bilateral prophylactic nipple sparing mastectomies with bilateral CYNTHIA flap reconstructions by Dr. Lerman on January 12, 2022.  The final pathology was completely benign and she comes in now for routine follow-up.

## 2022-09-15 ENCOUNTER — TRANSCRIPTION ENCOUNTER (OUTPATIENT)
Age: 32
End: 2022-09-15

## 2022-09-15 NOTE — ASU PATIENT PROFILE, ADULT - PRO MENTAL HEALTH SX RECENT
Problem: Patient Care Overview  Goal: Plan of Care Review  Outcome: Ongoing (interventions implemented as appropriate)  Plan of care reviewed with patient, daughter and care giver. Pt transferred from ICU this evening. Pt in no distress , no s/s of pain noted. Sinus jose on telemetry. Denies any chest pain.Pt on honey thickened liquid and aspiration precaution. Safety maintained .        sad

## 2022-09-16 ENCOUNTER — OUTPATIENT (OUTPATIENT)
Dept: OUTPATIENT SERVICES | Facility: HOSPITAL | Age: 32
LOS: 1 days | Discharge: HOME | End: 2022-09-16

## 2022-09-16 ENCOUNTER — RESULT REVIEW (OUTPATIENT)
Age: 32
End: 2022-09-16

## 2022-09-16 ENCOUNTER — TRANSCRIPTION ENCOUNTER (OUTPATIENT)
Age: 32
End: 2022-09-16

## 2022-09-16 VITALS
SYSTOLIC BLOOD PRESSURE: 126 MMHG | DIASTOLIC BLOOD PRESSURE: 68 MMHG | RESPIRATION RATE: 16 BRPM | TEMPERATURE: 98 F | HEART RATE: 88 BPM | OXYGEN SATURATION: 99 %

## 2022-09-16 VITALS
TEMPERATURE: 98 F | SYSTOLIC BLOOD PRESSURE: 120 MMHG | DIASTOLIC BLOOD PRESSURE: 70 MMHG | HEIGHT: 66 IN | WEIGHT: 181.88 LBS | HEART RATE: 63 BPM | RESPIRATION RATE: 16 BRPM | OXYGEN SATURATION: 100 %

## 2022-09-16 DIAGNOSIS — Z98.890 OTHER SPECIFIED POSTPROCEDURAL STATES: Chronic | ICD-10-CM

## 2022-09-16 PROCEDURE — 19380 REVJ RECONSTRUCTED BREAST: CPT | Mod: 50

## 2022-09-16 PROCEDURE — 14001 TIS TRNFR TRUNK 10.1-30SQCM: CPT | Mod: 59

## 2022-09-16 PROCEDURE — 88305 TISSUE EXAM BY PATHOLOGIST: CPT | Mod: 26

## 2022-09-16 PROCEDURE — 15771 GRFG AUTOL FAT LIPO 50 CC/<: CPT | Mod: 59

## 2022-09-16 PROCEDURE — 15772 GRFG AUTOL FAT LIPO EA ADDL: CPT

## 2022-09-16 PROCEDURE — 19120 REMOVAL OF BREAST LESION: CPT | Mod: RT,59

## 2022-09-16 RX ORDER — FLUOXETINE HCL 10 MG
80 CAPSULE ORAL
Qty: 0 | Refills: 0 | DISCHARGE

## 2022-09-16 RX ORDER — FENTANYL CITRATE 50 UG/ML
50 INJECTION INTRAVENOUS
Refills: 0 | Status: DISCONTINUED | OUTPATIENT
Start: 2022-09-16 | End: 2022-09-16

## 2022-09-16 RX ORDER — ONDANSETRON 8 MG/1
4 TABLET, FILM COATED ORAL ONCE
Refills: 0 | Status: DISCONTINUED | OUTPATIENT
Start: 2022-09-16 | End: 2022-09-16

## 2022-09-16 RX ORDER — LAMOTRIGINE 25 MG/1
1 TABLET, ORALLY DISINTEGRATING ORAL
Qty: 0 | Refills: 0 | DISCHARGE

## 2022-09-16 RX ORDER — DIAZEPAM 5 MG
2.5 TABLET ORAL ONCE
Refills: 0 | Status: DISCONTINUED | OUTPATIENT
Start: 2022-09-16 | End: 2022-09-16

## 2022-09-16 RX ORDER — OXYCODONE HYDROCHLORIDE 5 MG/1
5 TABLET ORAL ONCE
Refills: 0 | Status: DISCONTINUED | OUTPATIENT
Start: 2022-09-16 | End: 2022-09-16

## 2022-09-16 RX ORDER — HYDROMORPHONE HYDROCHLORIDE 2 MG/ML
0.5 INJECTION INTRAMUSCULAR; INTRAVENOUS; SUBCUTANEOUS
Refills: 0 | Status: DISCONTINUED | OUTPATIENT
Start: 2022-09-16 | End: 2022-09-16

## 2022-09-16 RX ORDER — APREPITANT 80 MG/1
40 CAPSULE ORAL ONCE
Refills: 0 | Status: COMPLETED | OUTPATIENT
Start: 2022-09-16 | End: 2022-09-16

## 2022-09-16 RX ORDER — SODIUM CHLORIDE 9 MG/ML
1000 INJECTION, SOLUTION INTRAVENOUS
Refills: 0 | Status: DISCONTINUED | OUTPATIENT
Start: 2022-09-16 | End: 2022-09-16

## 2022-09-16 RX ORDER — BUPROPION HYDROCHLORIDE 150 MG/1
1 TABLET, EXTENDED RELEASE ORAL
Qty: 0 | Refills: 0 | DISCHARGE

## 2022-09-16 RX ORDER — ACETAMINOPHEN 500 MG
1000 TABLET ORAL ONCE
Refills: 0 | Status: COMPLETED | OUTPATIENT
Start: 2022-09-16 | End: 2022-09-16

## 2022-09-16 RX ADMIN — APREPITANT 40 MILLIGRAM(S): 80 CAPSULE ORAL at 07:05

## 2022-09-16 RX ADMIN — Medication 1000 MILLIGRAM(S): at 07:05

## 2022-09-16 RX ADMIN — SODIUM CHLORIDE 100 MILLILITER(S): 9 INJECTION, SOLUTION INTRAVENOUS at 11:39

## 2022-09-16 RX ADMIN — FENTANYL CITRATE 50 MICROGRAM(S): 50 INJECTION INTRAVENOUS at 12:25

## 2022-09-16 RX ADMIN — Medication 2.5 MILLIGRAM(S): at 12:45

## 2022-09-16 RX ADMIN — HYDROMORPHONE HYDROCHLORIDE 0.5 MILLIGRAM(S): 2 INJECTION INTRAMUSCULAR; INTRAVENOUS; SUBCUTANEOUS at 12:20

## 2022-09-16 RX ADMIN — HYDROMORPHONE HYDROCHLORIDE 0.5 MILLIGRAM(S): 2 INJECTION INTRAMUSCULAR; INTRAVENOUS; SUBCUTANEOUS at 12:05

## 2022-09-16 RX ADMIN — OXYCODONE HYDROCHLORIDE 5 MILLIGRAM(S): 5 TABLET ORAL at 13:45

## 2022-09-16 RX ADMIN — OXYCODONE HYDROCHLORIDE 5 MILLIGRAM(S): 5 TABLET ORAL at 14:04

## 2022-09-16 RX ADMIN — SODIUM CHLORIDE 100 MILLILITER(S): 9 INJECTION, SOLUTION INTRAVENOUS at 14:15

## 2022-09-16 RX ADMIN — FENTANYL CITRATE 50 MICROGRAM(S): 50 INJECTION INTRAVENOUS at 12:40

## 2022-09-16 NOTE — ASU DISCHARGE PLAN (ADULT/PEDIATRIC) - CARE PROVIDER_API CALL
Lerman, Oren Z (MD)  Plastic Surgery  [facility] [address] [place], [place] [address]  Phone: [phone]  Fax: [phone]  Follow Up Time: Routine

## 2022-09-16 NOTE — BRIEF OPERATIVE NOTE - NSICDXBRIEFPROCEDURE_GEN_ALL_CORE_FT
PROCEDURES:  Revision of reconstruction of both breasts using fat grafts 16-Sep-2022 11:49:48  Ashley Chacko  Mastopexy, bilateral 16-Sep-2022 11:50:01  Ashley Chacko

## 2022-09-16 NOTE — PRE-ANESTHESIA EVALUATION ADULT - NSANTHADDINFOFT_GEN_ALL_CORE
Anesthetic plan was thoroughly discussed including the risk, benefit, alternatives and potential complications such as minor injuries to airway/teeth/lips, nerve damage, aspiration, hemodynamic instability and organ dysfunction related to the underlying pathology and the planned procedure. All questions were answered and the patient wants to proceed with the GETA plan. Consent in chart.

## 2022-09-16 NOTE — ASU DISCHARGE PLAN (ADULT/PEDIATRIC) - PROCEDURE
b/l mastopexy with fat grafting and excision of fat necrosis. Revision of abdominal scar and liposuction of abdomen and flanks

## 2022-09-16 NOTE — BRIEF OPERATIVE NOTE - OPERATION/FINDINGS
b/l mastopexy with excision of Right breast fat necrosis.  B/l breast fat grafting to upper pole.  dog ear excision on abdominal scar  liposuction of abdomen and flanks

## 2022-09-16 NOTE — ASU PREOP CHECKLIST - PATIENT'S PERSONAL PROPERTY REMOVED
right earring unable to remove, patient refused to remove it, risks explained pt still refused.  OR RN Tamy informed patient has a right earrings unable to remove, patient refused to remove it, risks explained to patient still refused,  OR MATT Kaur informed.   OR MATT Dove  and Dr Greer aware 2 white earrings removed by patient on right ear/jewelry

## 2022-09-20 ENCOUNTER — APPOINTMENT (OUTPATIENT)
Dept: PLASTIC SURGERY | Facility: CLINIC | Age: 32
End: 2022-09-20

## 2022-09-20 VITALS — HEIGHT: 66.5 IN | BODY MASS INDEX: 29.38 KG/M2 | OXYGEN SATURATION: 98 % | HEART RATE: 72 BPM | WEIGHT: 185 LBS

## 2022-09-20 LAB — SURGICAL PATHOLOGY STUDY: SIGNIFICANT CHANGE UP

## 2022-09-20 PROCEDURE — 99024 POSTOP FOLLOW-UP VISIT: CPT

## 2022-09-20 NOTE — ASSESSMENT
[FreeTextEntry1] : Healing well. PO care instructions reviewed, F/U in 6 weeks.  PAST MEDICAL HISTORY:  Diabetes     EtOH dependence

## 2022-09-20 NOTE — SURGICAL HISTORY
[de-identified] : 01/12/22; B/L Prophylactic mastectomy and CYNTHIA Flap Reconstruction may/ [place]  [de-identified] : 09/16/22; bilateral breast reconstruction revision (vertical mastopexy, excision of fat necrosis, abdominal donor site revision, liposuction, fat grafting)

## 2022-09-20 NOTE — PHYSICAL EXAM
[de-identified] : breast mounds soft, good contouir and symmetry, NACs viable, incisions C/d/I, no infection or collection [de-identified] : soft, no collections, + PO echymosis, good contour, no infection

## 2022-09-20 NOTE — HISTORY OF PRESENT ILLNESS
[FreeTextEntry1] : 30 y/o female presents 4 days s/p bilateral breast reconstruction revision (vertical mastopexy, excision of fat necrosis, abdominal donor site revision, liposuction). Denies any f/c/n/v. Patient is taking oxycodone, Tylenol, and Advil.

## 2022-12-19 ENCOUNTER — APPOINTMENT (OUTPATIENT)
Dept: PLASTIC SURGERY | Facility: CLINIC | Age: 32
End: 2022-12-19

## 2022-12-19 DIAGNOSIS — N65.0 DEFORMITY OF RECONSTRUCTED BREAST: ICD-10-CM

## 2022-12-19 DIAGNOSIS — N65.1 DEFORMITY OF RECONSTRUCTED BREAST: ICD-10-CM

## 2022-12-19 PROCEDURE — 99212 OFFICE O/P EST SF 10 MIN: CPT

## 2022-12-19 NOTE — PHYSICAL EXAM
[de-identified] : breast mounds soft, good contour and symmetry, well healed. [de-identified] : soft, no collections, , good contour, no infection

## 2022-12-19 NOTE — ASSESSMENT
[FreeTextEntry1] : Healing well. PO care instructions reviewed, encouraged use of scar cream, D/C aquaphor, return to all normal activities. F/U in 6 months

## 2022-12-19 NOTE — HISTORY OF PRESENT ILLNESS
[FreeTextEntry1] : 33 y/o female presents 3 months PO s/p bilateral revision breast reconstruction (vertical mastopexy, excision of fat necrosis, abdominal donor site revision, liposuction) on 9/16/22. Denies any f/c/n/v. Happy with the results

## 2022-12-19 NOTE — SURGICAL HISTORY
[de-identified] : 01/12/22; B/L Prophylactic mastectomy and CYNTHIA Flap Reconstruction may/ [place]  [de-identified] : 09/16/22; bilateral breast reconstruction revision (vertical mastopexy, excision of fat necrosis, abdominal donor site revision, liposuction, fat grafting)

## 2023-03-09 ENCOUNTER — OUTPATIENT (OUTPATIENT)
Dept: OUTPATIENT SERVICES | Facility: HOSPITAL | Age: 33
LOS: 1 days | End: 2023-03-09

## 2023-03-09 ENCOUNTER — APPOINTMENT (OUTPATIENT)
Dept: MRI IMAGING | Facility: CLINIC | Age: 33
End: 2023-03-09
Payer: COMMERCIAL

## 2023-03-09 DIAGNOSIS — Z98.890 OTHER SPECIFIED POSTPROCEDURAL STATES: Chronic | ICD-10-CM

## 2023-03-09 PROCEDURE — 77049 MRI BREAST C-+ W/CAD BI: CPT | Mod: 26

## 2023-03-15 DIAGNOSIS — Z15.01 GENETIC SUSCEPTIBILITY TO MALIGNANT NEOPLASM OF BREAST: ICD-10-CM

## 2023-03-15 DIAGNOSIS — Z90.13 ACQUIRED ABSENCE OF BILATERAL BREASTS AND NIPPLES: ICD-10-CM

## 2023-04-04 ENCOUNTER — APPOINTMENT (OUTPATIENT)
Dept: BREAST CENTER | Facility: CLINIC | Age: 33
End: 2023-04-04
Payer: COMMERCIAL

## 2023-04-04 VITALS
HEIGHT: 66 IN | HEART RATE: 65 BPM | BODY MASS INDEX: 29.73 KG/M2 | OXYGEN SATURATION: 98 % | WEIGHT: 185 LBS | SYSTOLIC BLOOD PRESSURE: 131 MMHG | DIASTOLIC BLOOD PRESSURE: 81 MMHG

## 2023-04-04 DIAGNOSIS — Z15.09 GENETIC SUSCEPTIBILITY TO MALIGNANT NEOPLASM OF BREAST: ICD-10-CM

## 2023-04-04 DIAGNOSIS — Z80.3 FAMILY HISTORY OF MALIGNANT NEOPLASM OF BREAST: ICD-10-CM

## 2023-04-04 DIAGNOSIS — Z15.01 GENETIC SUSCEPTIBILITY TO MALIGNANT NEOPLASM OF BREAST: ICD-10-CM

## 2023-04-04 DIAGNOSIS — Z90.13 ACQUIRED ABSENCE OF BILATERAL BREASTS AND NIPPLES: ICD-10-CM

## 2023-04-04 DIAGNOSIS — Z12.39 ENCOUNTER FOR OTHER SCREENING FOR MALIGNANT NEOPLASM OF BREAST: ICD-10-CM

## 2023-04-04 DIAGNOSIS — Z80.41 FAMILY HISTORY OF MALIGNANT NEOPLASM OF OVARY: ICD-10-CM

## 2023-04-04 PROCEDURE — 99213 OFFICE O/P EST LOW 20 MIN: CPT

## 2023-04-04 NOTE — PHYSICAL EXAM
[Normocephalic] : normocephalic [Atraumatic] : atraumatic [EOMI] : extra ocular movement intact [Supple] : supple [No Supraclavicular Adenopathy] : no supraclavicular adenopathy [No Cervical Adenopathy] : no cervical adenopathy [Examined in the supine and seated position] : examined in the supine and seated position [No dominant masses] : no dominant masses in right breast  [No dominant masses] : no dominant masses left breast [No Nipple Retraction] : no left nipple retraction [No Nipple Discharge] : no left nipple discharge [Breast Mass Right Breast ___cm] : no masses [Breast Mass Left Breast ___cm] : no masses [No Axillary Lymphadenopathy] : no left axillary lymphadenopathy [No Edema] : no edema [No Rashes] : no rashes [No Ulceration] : no ulceration [Breast Nipple Inversion] : nipples not inverted [Breast Nipple Retraction] : nipples not retracted [Breast Nipple Flattening] : nipples not flattened [Breast Nipple Fissures] : nipples not fissured [Breast Abnormal Lactation (Galactorrhea)] : no galactorrhea [Breast Abnormal Secretion Bloody Fluid] : no bloody discharge [Breast Abnormal Secretion Serous Fluid] : no serous discharge [Breast Abnormal Secretion Opalescent Fluid] : no milky discharge [de-identified] : On exam, the patient has done well status post bilateral prophylactic nipple sparing mastectomies with bilateral CYNTHIA flap reconstructions.  She has a good result with no suspicious findings in either CYNTHIA flap.  She has no axillary, supraclavicular, or cervical adenopathy.  She has no sensation in either nipple areolar complex or inferior pole.  She has some moderate sensation on the medial poles but no sensation on the superior or lateral poles [de-identified] : Status post prophylactic nipple sparing mastectomy with CYNTHIA flap reconstruction.  Wounds have healed well and she has no suspicious findings. [de-identified] : Status post prophylactic nipple sparing mastectomy with CYNTHIA flap reconstruction.  Wounds have healed well and she has no suspicious findings.

## 2023-04-04 NOTE — HISTORY OF PRESENT ILLNESS
[FreeTextEntry1] : The patient is a 32-year-old nulliparous premenopausal white female with Ashkenazi Eastern  [employer]din heritage.  She underwent menarche at age 13.  She has a strong family history with her mother had breast cancer at age 57.  Her maternal aunt had ovarian cancer at age 44.  Another maternal aunt had breast cancer at age 38.  A paternal great grandmother had breast cancer at age 69.  Her maternal great grandfather had gastric cancer at age 61.  Her maternal grandfather had hepatobiliary cancer at age 39.  The patient's mother as well as the patient and her brother all tested BRCA1 positive with a c.3598C>T mutation.  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  The patient had negative preoperative diagnostic studies and underwent bilateral prophylactic nipple sparing mastectomies with bilateral CYNTHIA flap reconstructions by Dr. Lerman on January 12, 2022.  The final pathology was completely benign and she comes in now for routine follow-up.

## 2023-04-04 NOTE — ASSESSMENT
[FreeTextEntry1] : The patient is a 32-year-old nulliparous premenopausal white female with Ashkenazi Eastern  Zoroastrian heritage.  She underwent menarche at age 13.  She has a strong family history with her mother had breast cancer at age 57.  Her maternal aunt had ovarian cancer at age 44.  Another maternal aunt had breast cancer at age 38.  A paternal great grandmother had breast cancer at age 69.  Her maternal great grandfather had gastric cancer at age 61.  Her maternal grandfather had hepatobiliary cancer at age 39.  The patient's mother as well as the patient and her brother all tested BRCA1 positive with a c.3598C>T mutation.  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  The patient submitted all her prior films from outside and she did have her last MRI on May 21, 2021 which showed no suspicious findings.  She underwent a bilateral mammography at [place] with a bilateral ultrasound here at [place] on January 7, 2022 showing no suspicious findings.  She saw Dr. Lerman in consultation for reconstructive surgery and decided to go forward with risk reduction prophylactic mastectomy.  She underwent bilateral prophylactic nipple sparing mastectomies with bilateral CYNTHIA flap reconstructions performed on January 12, 2022.  Final pathology was benign.  On exam today, her CYNTHIA flaps are well-healed and she has a good cosmetic result with no suspicious findings.  She underwent her CYNTHIA flap revision surgery performed by Dr. Lerman on September 16, 2022.  She underwent a follow-up bilateral breast MRI about 1 year postop on March 9, 2023 which was reviewed and showed no suspicious enhancement or findings.  I would like to see her again now in 6 months in October 2023.  If she is doing well at that time I can consider yearly follow-ups.  She understands the need for close GYN oncology follow-up and the benefits for prophylactic risk reduction bilateral salpingo-oophorectomy before age 40.

## 2023-08-14 ENCOUNTER — APPOINTMENT (OUTPATIENT)
Dept: PLASTIC SURGERY | Facility: CLINIC | Age: 33
End: 2023-08-14
Payer: COMMERCIAL

## 2023-08-14 DIAGNOSIS — Z42.1 ENCOUNTER FOR BREAST RECONSTRUCTION FOLLOWING MASTECTOMY: ICD-10-CM

## 2023-08-14 PROCEDURE — 99213 OFFICE O/P EST LOW 20 MIN: CPT

## 2023-08-14 NOTE — HISTORY OF PRESENT ILLNESS
[FreeTextEntry1] : 33 y/o female presents s/p bilateral revision breast reconstruction (vertical mastopexy, excision of fat necrosis, abdominal donor site revision, liposuction) on 9/16/22. Denies any f/c/n/v. Patient is here for a follow-up visit to reassess healing. No complains offered. She is very pleased with her results.

## 2023-08-14 NOTE — SURGICAL HISTORY
[de-identified] : 01/12/22; B/L Prophylactic mastectomy and CYNTHIA Flap Reconstruction may/ [place]  [de-identified] : 09/16/22; bilateral breast reconstruction revision (vertical mastopexy, excision of fat necrosis, abdominal donor site revision, liposuction, fat grafting)

## 2023-08-14 NOTE — PHYSICAL EXAM
[de-identified] : breast mounds soft, good contour and symmetry, well healed. [de-identified] : soft, no collections, , good contour, no infection, no hernias.

## 2023-08-14 NOTE — ASSESSMENT
[FreeTextEntry1] : Well healed. with excellent result. Reviewed care and course FU with Dr. Wood for annual exam Can F/U with us if anything arises PRN..

## 2024-01-25 NOTE — HISTORY OF PRESENT ILLNESS
[FreeTextEntry1] : 32 y/o female presents 5.5 weeks s/p bilateral breast prophylactic CYNTHIA flap reconstruction joint with Dr. Wood. Doing well at home, denies f/c/n/v. Has abdominal fluid collection.  Quality 130: Documentation Of Current Medications In The Medical Record: Current Medications Documented Quality 226: Preventive Care And Screening: Tobacco Use: Screening And Cessation Intervention: Patient screened for tobacco use and is an ex/non-smoker Detail Level: Detailed Quality 431: Preventive Care And Screening: Unhealthy Alcohol Use - Screening: Patient not identified as an unhealthy alcohol user when screened for unhealthy alcohol use using a systematic screening method

## 2024-05-27 ENCOUNTER — NON-APPOINTMENT (OUTPATIENT)
Age: 34
End: 2024-05-27

## 2024-06-02 ENCOUNTER — NON-APPOINTMENT (OUTPATIENT)
Age: 34
End: 2024-06-02

## 2024-06-02 NOTE — HISTORY OF PRESENT ILLNESS
[FreeTextEntry1] : The patient is a 33-year-old nulliparous premenopausal white female with Ashkenazi Eastern  M[employer]on heritage.  She underwent menarche at age 13.  She has a strong family history with her mother had breast cancer at age 57.  Her maternal aunt had ovarian cancer at age 44.  Another maternal aunt had breast cancer at age 38.  A paternal great grandmother had breast cancer at age 69.  Her maternal great grandfather had gastric cancer at age 61.  Her maternal grandfather had hepatobiliary cancer at age 39.  The patient's mother as well as the patient and her brother all tested BRCA1 positive with a c.3598C>T mutation.  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  The patient had negative preoperative diagnostic studies and underwent bilateral prophylactic nipple sparing mastectomies with bilateral CYNTHIA flap reconstructions by Dr. Lerman on January 12, 2022.  The final pathology was completely benign and she comes in now for routine follow-up.

## 2024-06-02 NOTE — ASSESSMENT
[FreeTextEntry1] : The patient is a 33-year-old nulliparous premenopausal white female with Ashkenazi Eastern  Jain heritage.  She underwent menarche at age 13.  She has a strong family history with her mother had breast cancer at age 57.  Her maternal aunt had ovarian cancer at age 44.  Another maternal aunt had breast cancer at age 38.  A paternal great grandmother had breast cancer at age 69.  Her maternal great grandfather had gastric cancer at age 61.  Her maternal grandfather had hepatobiliary cancer at age 39.  The patient's mother as well as the patient and her brother all tested BRCA1 positive with a c.3598C>T mutation.  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  The patient submitted all her prior films from outside and she did have her last MRI on May 21, 2021 which showed no suspicious findings.  She underwent a bilateral mammography at [place] with a bilateral ultrasound here at [place] on January 7, 2022 showing no suspicious findings.  She saw Dr. Lerman in consultation for reconstructive surgery and decided to go forward with risk reduction prophylactic mastectomy.  She underwent bilateral prophylactic nipple sparing mastectomies with bilateral CYNTHIA flap reconstructions performed on January 12, 2022.  Final pathology was benign.  On exam today, her CYNTHIA flaps are well-healed and she has a good cosmetic result with no suspicious findings.  She underwent her CYNTHIA flap revision surgery performed by Dr. Lerman on September 16, 2022.  She underwent a follow-up bilateral breast MRI about 1 year postop on March 9, 2023 which was reviewed and showed no suspicious enhancement or findings.  I would like to see her again now in 1 year in June 2025.  She understands the need for close GYN oncology follow-up and the benefits for prophylactic risk reduction bilateral salpingo-oophorectomy before age 40.

## 2024-06-02 NOTE — PHYSICAL EXAM
[Normocephalic] : normocephalic [Atraumatic] : atraumatic [EOMI] : extra ocular movement intact [Supple] : supple [No Supraclavicular Adenopathy] : no supraclavicular adenopathy [No Cervical Adenopathy] : no cervical adenopathy [Examined in the supine and seated position] : examined in the supine and seated position [No dominant masses] : no dominant masses in right breast  [No dominant masses] : no dominant masses left breast [No Nipple Retraction] : no left nipple retraction [No Nipple Discharge] : no left nipple discharge [Breast Mass Right Breast ___cm] : no masses [Breast Mass Left Breast ___cm] : no masses [No Axillary Lymphadenopathy] : no left axillary lymphadenopathy [No Edema] : no edema [No Rashes] : no rashes [No Ulceration] : no ulceration [Breast Nipple Inversion] : nipples not inverted [Breast Nipple Retraction] : nipples not retracted [Breast Nipple Flattening] : nipples not flattened [Breast Nipple Fissures] : nipples not fissured [Breast Abnormal Lactation (Galactorrhea)] : no galactorrhea [Breast Abnormal Secretion Serous Fluid] : no serous discharge [Breast Abnormal Secretion Bloody Fluid] : no bloody discharge [Breast Abnormal Secretion Opalescent Fluid] : no milky discharge [de-identified] : On exam, the patient has done well status post bilateral prophylactic nipple sparing mastectomies with bilateral CYNTHIA flap reconstructions.  She has a good result with no suspicious findings in either CYNTHIA flap.  She has no axillary, supraclavicular, or cervical adenopathy.  She has no sensation in either nipple areolar complex or inferior pole.  She has some moderate sensation on the medial poles but no sensation on the superior or lateral poles [de-identified] : Status post prophylactic nipple sparing mastectomy with CYNTHIA flap reconstruction.  Wounds have healed well and she has no suspicious findings. [de-identified] : Status post prophylactic nipple sparing mastectomy with CYNTHIA flap reconstruction.  Wounds have healed well and she has no suspicious findings.

## 2024-06-17 NOTE — PAST MEDICAL HISTORY
[Menarche Age ____] : age at menarche was [unfilled] [History of Hormone Replacement Treatment] : has no history of hormone replacement treatment [Definite ___ (Date)] : the last menstrual period was [unfilled] [Total Preg ___] : G[unfilled] [Live Births ___] : P[unfilled]

## 2024-06-17 NOTE — PHYSICAL EXAM
[Normocephalic] : normocephalic [Atraumatic] : atraumatic [EOMI] : extra ocular movement intact [Supple] : supple [No Supraclavicular Adenopathy] : no supraclavicular adenopathy [No Cervical Adenopathy] : no cervical adenopathy [Examined in the supine and seated position] : examined in the supine and seated position [No dominant masses] : no dominant masses in right breast  [No dominant masses] : no dominant masses left breast [No Nipple Retraction] : no left nipple retraction [No Nipple Discharge] : no left nipple discharge [Breast Mass Right Breast ___cm] : no masses [Breast Mass Left Breast ___cm] : no masses [No Axillary Lymphadenopathy] : no left axillary lymphadenopathy [No Edema] : no edema [No Rashes] : no rashes [No Ulceration] : no ulceration [Breast Nipple Inversion] : nipples not inverted [Breast Nipple Retraction] : nipples not retracted [Breast Nipple Flattening] : nipples not flattened [Breast Nipple Fissures] : nipples not fissured [Breast Abnormal Lactation (Galactorrhea)] : no galactorrhea [Breast Abnormal Secretion Bloody Fluid] : no bloody discharge [Breast Abnormal Secretion Serous Fluid] : no serous discharge [Breast Abnormal Secretion Opalescent Fluid] : no milky discharge [de-identified] : On exam, the patient has done well status post bilateral prophylactic nipple sparing mastectomies with bilateral CYNTHIA flap reconstructions.  She has a good result with no suspicious findings in either CYNTHIA flap.  She has no axillary, supraclavicular, or cervical adenopathy.  She has no sensation in either nipple areolar complex or inferior pole.  She has some moderate sensation on the medial poles but no sensation on the superior or lateral poles [de-identified] : Status post prophylactic nipple sparing mastectomy with CYNTHIA flap reconstruction.  Wounds have healed well and she has no suspicious findings. [de-identified] : Status post prophylactic nipple sparing mastectomy with CYNTHIA flap reconstruction.  Wounds have healed well and she has no suspicious findings.

## 2024-06-17 NOTE — ASSESSMENT
[FreeTextEntry1] : The patient is a 33-year-old nulliparous premenopausal white female with Ashkenazi Eastern  Confucianist heritage.  She underwent menarche at age 13.  She has a strong family history with her mother had breast cancer at age 57.  Her maternal aunt had ovarian cancer at age 44.  Another maternal aunt had breast cancer at age 38.  A paternal great grandmother had breast cancer at age 69.  Her maternal great grandfather had gastric cancer at age 61.  Her maternal grandfather had hepatobiliary cancer at age 39.  The patient's mother as well as the patient and her brother all tested BRCA1 positive with a c.3598C>T mutation.  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  The patient submitted all her prior films from outside and she did have her last MRI on May 21, 2021 which showed no suspicious findings.  She underwent a bilateral mammography at [place] with a bilateral ultrasound here at [place] on January 7, 2022 showing no suspicious findings.  She saw Dr. Lerman in consultation for reconstructive surgery and decided to go forward with risk reduction prophylactic mastectomy.  She underwent bilateral prophylactic nipple sparing mastectomies with bilateral CYNTHIA flap reconstructions performed on January 12, 2022.  Final pathology was benign.  On exam today, her CYNTHIA flaps are well-healed and she has a good cosmetic result with no suspicious findings.  She underwent her CYNTHIA flap revision surgery performed by Dr. Lerman on September 16, 2022.  She underwent a follow-up bilateral breast MRI about 1 year postop on March 9, 2023 which was reviewed and showed no suspicious enhancement or findings.  I would like to see her again now in 1 year in June 2025.  She understands the need for close GYN oncology follow-up and the benefits for prophylactic risk reduction bilateral salpingo-oophorectomy before age 40.

## 2024-06-17 NOTE — HISTORY OF PRESENT ILLNESS
[FreeTextEntry1] : The patient is a 33-year-old nulliparous premenopausal white female with Ashkenazi Eastern  [place] heritage.  She underwent menarche at age 13.  She has a strong family history with her mother had breast cancer at age 57.  Her maternal aunt had ovarian cancer at age 44.  Another maternal aunt had breast cancer at age 38.  A paternal great grandmother had breast cancer at age 69.  Her maternal great grandfather had gastric cancer at age 61.  Her maternal grandfather had hepatobiliary cancer at age 39.  The patient's mother as well as the patient and her brother all tested BRCA1 positive with a c.3598C>T mutation.  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  The patient had negative preoperative diagnostic studies and underwent bilateral prophylactic nipple sparing mastectomies with bilateral CYNTHIA flap reconstructions by Dr. Lerman on January 12, 2022.  The final pathology was completely benign and she comes in now for routine follow-up.

## 2024-06-17 NOTE — HISTORY OF PRESENT ILLNESS
[FreeTextEntry1] : The patient is a 33-year-old nulliparous premenopausal white female with Ashkenazi Eastern  [place] heritage.  She underwent menarche at age 13.  She has a strong family history with her mother had breast cancer at age 57.  Her maternal aunt had ovarian cancer at age 44.  Another maternal aunt had breast cancer at age 38.  A paternal great grandmother had breast cancer at age 69.  Her maternal great grandfather had gastric cancer at age 61.  Her maternal grandfather had hepatobiliary cancer at age 39.  The patient's mother as well as the patient and her brother all tested BRCA1 positive with a c.3598C>T mutation.  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  The patient had negative preoperative diagnostic studies and underwent bilateral prophylactic nipple sparing mastectomies with bilateral CYNTHIA flap reconstructions by Dr. Lerman on January 12, 2022.  The final pathology was completely benign and she comes in now for routine follow-up.

## 2024-06-17 NOTE — PHYSICAL EXAM
[Normocephalic] : normocephalic [Atraumatic] : atraumatic [EOMI] : extra ocular movement intact [Supple] : supple [No Supraclavicular Adenopathy] : no supraclavicular adenopathy [No Cervical Adenopathy] : no cervical adenopathy [Examined in the supine and seated position] : examined in the supine and seated position [No dominant masses] : no dominant masses in right breast  [No dominant masses] : no dominant masses left breast [No Nipple Retraction] : no left nipple retraction [No Nipple Discharge] : no left nipple discharge [Breast Mass Right Breast ___cm] : no masses [Breast Mass Left Breast ___cm] : no masses [Breast Nipple Inversion] : nipples not inverted [Breast Nipple Retraction] : nipples not retracted [Breast Nipple Flattening] : nipples not flattened [Breast Nipple Fissures] : nipples not fissured [Breast Abnormal Lactation (Galactorrhea)] : no galactorrhea [Breast Abnormal Secretion Bloody Fluid] : no bloody discharge [Breast Abnormal Secretion Serous Fluid] : no serous discharge [Breast Abnormal Secretion Opalescent Fluid] : no milky discharge [No Axillary Lymphadenopathy] : no left axillary lymphadenopathy [No Edema] : no edema [No Rashes] : no rashes [No Ulceration] : no ulceration [de-identified] : On exam, the patient has done well status post bilateral prophylactic nipple sparing mastectomies with bilateral CYNTHIA flap reconstructions.  She has a good result with no suspicious findings in either CYNTHIA flap.  She has no axillary, supraclavicular, or cervical adenopathy.  She has no sensation in either nipple areolar complex or inferior pole.  She has some moderate sensation on the medial poles but no sensation on the superior or lateral poles [de-identified] : Status post prophylactic nipple sparing mastectomy with CYNTHIA flap reconstruction.  Wounds have healed well and she has no suspicious findings. [de-identified] : Status post prophylactic nipple sparing mastectomy with CYNTHIA flap reconstruction.  Wounds have healed well and she has no suspicious findings.

## 2024-06-24 ENCOUNTER — APPOINTMENT (OUTPATIENT)
Dept: BREAST CENTER | Facility: CLINIC | Age: 34
End: 2024-06-24

## 2024-12-30 ENCOUNTER — NON-APPOINTMENT (OUTPATIENT)
Age: 34
End: 2024-12-30

## 2025-01-16 ENCOUNTER — APPOINTMENT (OUTPATIENT)
Dept: BREAST CENTER | Facility: CLINIC | Age: 35
End: 2025-01-16
Payer: COMMERCIAL

## 2025-01-16 VITALS
HEIGHT: 66 IN | BODY MASS INDEX: 29.73 KG/M2 | HEART RATE: 71 BPM | DIASTOLIC BLOOD PRESSURE: 71 MMHG | WEIGHT: 185 LBS | SYSTOLIC BLOOD PRESSURE: 121 MMHG | OXYGEN SATURATION: 99 %

## 2025-01-16 DIAGNOSIS — Z15.01 GENETIC SUSCEPTIBILITY TO MALIGNANT NEOPLASM OF BREAST: ICD-10-CM

## 2025-01-16 DIAGNOSIS — Z80.3 FAMILY HISTORY OF MALIGNANT NEOPLASM OF BREAST: ICD-10-CM

## 2025-01-16 DIAGNOSIS — Z80.41 FAMILY HISTORY OF MALIGNANT NEOPLASM OF OVARY: ICD-10-CM

## 2025-01-16 DIAGNOSIS — Z15.09 GENETIC SUSCEPTIBILITY TO MALIGNANT NEOPLASM OF BREAST: ICD-10-CM

## 2025-01-16 DIAGNOSIS — Z90.13 ACQUIRED ABSENCE OF BILATERAL BREASTS AND NIPPLES: ICD-10-CM

## 2025-01-16 DIAGNOSIS — Z12.39 ENCOUNTER FOR OTHER SCREENING FOR MALIGNANT NEOPLASM OF BREAST: ICD-10-CM

## 2025-01-16 PROCEDURE — 99213 OFFICE O/P EST LOW 20 MIN: CPT

## 2025-04-23 NOTE — ASSESSMENT
[FreeTextEntry1] : The patient is a 33-year-old nulliparous premenopausal white female with Ashkenazi Eastern  Congregation heritage.  She underwent menarche at age 13.  She has a strong family history with her mother had breast cancer at age 57.  Her maternal aunt had ovarian cancer at age 44.  Another maternal aunt had breast cancer at age 38.  A paternal great grandmother had breast cancer at age 69.  Her maternal great grandfather had gastric cancer at age 61.  Her maternal grandfather had hepatobiliary cancer at age 39.  The patient's mother as well as the patient and her brother all tested BRCA1 positive with a [id number].3598C>T mutation.  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  The patient submitted all her prior films from outside and she did have her last MRI on May 21, 2021 which showed no suspicious findings.  She underwent a bilateral mammography at [place] with a bilateral ultrasound here at [place] on January 7, 2022 showing no suspicious findings.  She saw Dr. Lerman in consultation for reconstructive surgery and decided to go forward with risk reduction prophylactic mastectomy.  She underwent bilateral prophylactic nipple sparing mastectomies with bilateral CYNTHIA flap reconstructions performed on January 12, 2022.  Final pathology was benign.  On exam today, her CYNTHIA flaps are well-healed and she has a good cosmetic result with no suspicious findings.  She underwent her CYNTHIA flap revision surgery performed by Dr. Lerman on September 16, 2022.  She underwent a follow-up bilateral breast MRI about 1 year postop on March 9, 2023 which was reviewed and showed no suspicious enhancement or findings.  I would like to see her again now in 1 year in June 2025.  She understands the need for close GYN oncology follow-up and the benefits for prophylactic risk reduction bilateral salpingo-oophorectomy before age 40.  seizures

## 2025-07-24 NOTE — ASU PATIENT PROFILE, ADULT - FALL HARM RISK - CONCLUSION
Copied from CRM #[id number]. Topic: MW Messaging - MW Patient Request  >> Jul 24, 2025 11:15 AM Arben FINK wrote:  --DO NOT REPLY - Sent from PACT - If sent to wrong pool, reroute to P ECO Reroute pool --    General Message: Patient was requesting orders for fasting labs to be put in so she can have it done prior to her appointment in August   Callback #: [phone]  Can a detailed message be left? Yes - Voicemail   Caller has been advised this message will be addressed within:2-3 business days [routine]         Universal Safety Interventions

## (undated) DEVICE — SUT NYLON 11-0 4" DRM4

## (undated) DEVICE — SYS RESOLVE FAT PROCESSING 1 UNIT

## (undated) DEVICE — DRSG GAUZE SPONGE 2X2" STERILE

## (undated) DEVICE — BAG SPONGE COUNTER EZ

## (undated) DEVICE — SYR CATH TIP 2 OZ

## (undated) DEVICE — SLV COMPRESSION KNEE MED

## (undated) DEVICE — GLV 7.5 PROTEXIS (WHITE)

## (undated) DEVICE — WARMING BLANKET LOWER ADULT

## (undated) DEVICE — STAPLER SKIN VISI-STAT 35 WIDE

## (undated) DEVICE — DRSG TEGADERM 4X4.75

## (undated) DEVICE — DRSG MASTISOL

## (undated) DEVICE — NORMOFLO IRRIGATING SET 500MM/HG

## (undated) DEVICE — PACK BREAST RECONSTRUCTION

## (undated) DEVICE — SUT MONOCRYL 3-0 18" PS-2 UNDYED

## (undated) DEVICE — DRSG TELFA 3 X 8

## (undated) DEVICE — WARMING BLANKET FULL UNDERBODY

## (undated) DEVICE — TUBING MICROAIRE ASPIRATION SET 12FT

## (undated) DEVICE — NDL HYPO SAFE 18G X 1.5" (PINK)

## (undated) DEVICE — POSITIONER FOAM EGG CRATE ULNAR 2PCS (PINK)

## (undated) DEVICE — SUT MONOCRYL 4-0 18" P-3 UNDYED

## (undated) DEVICE — DRSG STERISTRIPS 0.5 X 4"

## (undated) DEVICE — ONETRAC LIGHTED RETRACTOR 135 X 30MM DISP

## (undated) DEVICE — SUT VICRYL 2-0 27" CT-1 UNDYED

## (undated) DEVICE — SYR LUER LOK 10CC

## (undated) DEVICE — ABDOMINAL BINDER MED/LG 12" X 45"-62"

## (undated) DEVICE — NDL HYPO REGULAR BEVEL 25G X 1.5" (BLUE)

## (undated) DEVICE — SUT PLAIN GUT FAST ABSORBING 5-0 PC-1

## (undated) DEVICE — Device